# Patient Record
Sex: FEMALE | Race: WHITE | NOT HISPANIC OR LATINO | Employment: FULL TIME | URBAN - NONMETROPOLITAN AREA
[De-identification: names, ages, dates, MRNs, and addresses within clinical notes are randomized per-mention and may not be internally consistent; named-entity substitution may affect disease eponyms.]

---

## 2021-04-06 ENCOUNTER — OFFICE VISIT (OUTPATIENT)
Dept: FAMILY MEDICINE | Facility: OTHER | Age: 26
End: 2021-04-06
Attending: NURSE PRACTITIONER
Payer: COMMERCIAL

## 2021-04-06 VITALS
DIASTOLIC BLOOD PRESSURE: 74 MMHG | RESPIRATION RATE: 16 BRPM | BODY MASS INDEX: 22.36 KG/M2 | HEART RATE: 89 BPM | OXYGEN SATURATION: 99 % | TEMPERATURE: 99 F | HEIGHT: 65 IN | SYSTOLIC BLOOD PRESSURE: 122 MMHG | WEIGHT: 134.2 LBS

## 2021-04-06 DIAGNOSIS — N89.8 VAGINAL DISCHARGE: ICD-10-CM

## 2021-04-06 DIAGNOSIS — B96.89 BACTERIAL VAGINOSIS: Primary | ICD-10-CM

## 2021-04-06 DIAGNOSIS — N89.8 VAGINAL ITCHING: ICD-10-CM

## 2021-04-06 DIAGNOSIS — Z11.3 SCREEN FOR STD (SEXUALLY TRANSMITTED DISEASE): ICD-10-CM

## 2021-04-06 DIAGNOSIS — N76.0 BACTERIAL VAGINOSIS: Primary | ICD-10-CM

## 2021-04-06 LAB
C TRACH DNA SPEC QL NAA+PROBE: NOT DETECTED
N GONORRHOEA DNA SPEC QL NAA+PROBE: NOT DETECTED
SPECIMEN SOURCE: ABNORMAL
SPECIMEN SOURCE: NORMAL
WET PREP SPEC: ABNORMAL

## 2021-04-06 PROCEDURE — 87491 CHLMYD TRACH DNA AMP PROBE: CPT | Mod: ZL | Performed by: PHYSICIAN ASSISTANT

## 2021-04-06 PROCEDURE — 87591 N.GONORRHOEAE DNA AMP PROB: CPT | Mod: ZL | Performed by: PHYSICIAN ASSISTANT

## 2021-04-06 PROCEDURE — 87210 SMEAR WET MOUNT SALINE/INK: CPT | Mod: ZL | Performed by: PHYSICIAN ASSISTANT

## 2021-04-06 PROCEDURE — 99203 OFFICE O/P NEW LOW 30 MIN: CPT | Performed by: PHYSICIAN ASSISTANT

## 2021-04-06 RX ORDER — LEVONORGESTREL/ETHIN.ESTRADIOL 0.1-0.02MG
1 TABLET ORAL DAILY
COMMUNITY

## 2021-04-06 RX ORDER — METRONIDAZOLE 500 MG/1
500 TABLET ORAL 2 TIMES DAILY
Qty: 14 TABLET | Refills: 0 | Status: SHIPPED | OUTPATIENT
Start: 2021-04-06 | End: 2021-04-13

## 2021-04-06 ASSESSMENT — MIFFLIN-ST. JEOR: SCORE: 1364.61

## 2021-04-06 ASSESSMENT — PAIN SCALES - GENERAL: PAINLEVEL: NO PAIN (0)

## 2021-04-06 NOTE — PROGRESS NOTES
"HPI:    Maria Guadalupe Kang is a 25 year old female  who presents to [facility] today for concerns of STD. She has an odor to her vaginal discharge as well as irritation (no pruritis) which started yesterday.    Treated for yeast infection a month ago via virtual visit- was not examined and was given 1 tablet of diflucan. Was told to call if no improvement and she was given a second dose approximately 1 week after her initial dose - [facility]    Parity: 0 children  LMP: about a month ago, no concerns of pregnancy    Vaginal Symptoms:  Burning: No  Discharge: Yes: - while in color - normal for her  [facility] Yes - about 2 months  Exposure to STIs: Unknown  History of STIs: No  Condom Usage: No  Fevers, Chills, Signs of Infection: No    Treatments Tried and Outcome: none    PCP: None    History reviewed. No pertinent past medical history.  History reviewed. No pertinent surgical history.  Social History     Tobacco Use     Smoking status: Never Smoker     Smokeless tobacco: Never Used   Substance Use Topics     Alcohol use: Yes     Current Outpatient Medications   Medication Sig Dispense Refill     levonorgestrel-ethinyl estradiol (AUBRA EQ) 0.1-20 MG-MCG tablet Take 1 tablet by mouth daily       No Known Allergies    Past medical history, past surgical history, current medications and allergies reviewed and accurate to the best of my knowledge.      ROS:  Refer to HPI    /74   Pulse 89   Temp 99  F (37.2  C) (Tympanic)   Resp 16   Ht 1.651 m (5' 5\")   Wt 60.9 kg (134 lb 3.2 oz)   LMP 03/10/2021 (Within Days)   SpO2 99%   BMI 22.33 kg/m      EXAM:  General Appearance: Well appearing 25-year old female, appropriate appearance for age. No acute distress  Respiratory: normal chest wall and respirations.  Normal effort.  Clear to auscultation bilaterally, no wheezing, crackles or rhonchi.  No increased work of breathing.  No cough appreciated.  Cardiac: RRR with no murmurs  Abdomen: soft, " nontender, no rigidity, no rebound tenderness or guarding, normal bowel sounds present  :  No suprapubic tenderness to palpation.  No CVA tenderness to palpation.  On vaginal speculum exam there is white discharge noted.  Vaginal walls and vulva are normal and without erythema, irritation or sores. No odor noted. There is no vaginal or cervical erythema or tenderness upon wet prep and GC chlamydia testing.  Musculoskeletal:  Equal movement of bilateral upper extremities.  Equal movement of bilateral lower extremities.  Normal gait.    Dermatological: no rashes noted of exposed skin  Psychological: normal affect, alert, oriented, and pleasant.     Labs:  Wet prep: positive for clue cells, negative for trichomonas and yeast  Vaginal GC: negative for both    ASSESSMENT/PLAN:    I have reviewed the nursing notes.  I have reviewed the findings, diagnosis, plan and need for follow up with the patient.    (N76.0,  B96.89) Bacterial vaginosis  (primary encounter diagnosis)  (Z11.3) Screen for STD (sexually transmitted disease)  (N89.8) Vaginal discharge  (N89.8) Vaginal itching  Comment: Intermittent onset, worse recently  Plan: Wet Prep, Genital, GC/Chlamydia by PCR, metroNIDAZOLE (FLAGYL) 500 MG tablet         exam performed and G/C and wet preps performed. G/C negative and wet prep was positive for clue cells. Patient will be treated with Flagyl 500mg PO BID x 7-days. She understands to take this until completion even if she feels better prior to this. Recurrence between 3-12 months can occur, she is aware of this. Increase probiotic use while on treatment. Discussed with patient to NOT drink alcohol while taking the medication.  If she develops fevers, chills, worsening signs of infection or recurrent symptoms, she should return for repeat evaluation. Patient called and updated with this information, no questions/concerns. She agrees and understands with above plan.     Alissa Mooney PA-C  4/6/2021  1:37  PM    Discussed warning signs/symptoms indicative of need to f/u    Follow up if symptoms persist or worsen or concerns    I explained my diagnostic considerations and recommendations to the patient, who voiced understanding and agreement with the treatment plan. All questions were answered. We discussed potential side effects of any prescribed or recommended therapies, as well as expectations for response to treatments.

## 2021-04-06 NOTE — NURSING NOTE
"Chief Complaint   Patient presents with     STD     testing     Patient is here for STD testing. Patient states she has an odor to her discharge. Patient states she also has vaginal irritation (not itchy) that started yesterday.     Initial /74   Pulse 89   Temp 99  F (37.2  C) (Tympanic)   Resp 16   Ht 1.651 m (5' 5\")   Wt 60.9 kg (134 lb 3.2 oz)   LMP 03/10/2021 (Within Days)   SpO2 99%   BMI 22.33 kg/m   Estimated body mass index is 22.33 kg/m  as calculated from the following:    Height as of this encounter: 1.651 m (5' 5\").    Weight as of this encounter: 60.9 kg (134 lb 3.2 oz).  Medication Reconciliation: complete    Marilee Devries LPN  "

## 2021-05-10 ENCOUNTER — OFFICE VISIT (OUTPATIENT)
Dept: FAMILY MEDICINE | Facility: OTHER | Age: 26
End: 2021-05-10
Attending: PHYSICIAN ASSISTANT
Payer: MEDICAID

## 2021-05-10 VITALS
DIASTOLIC BLOOD PRESSURE: 82 MMHG | RESPIRATION RATE: 18 BRPM | OXYGEN SATURATION: 96 % | SYSTOLIC BLOOD PRESSURE: 128 MMHG | TEMPERATURE: 98.3 F | BODY MASS INDEX: 22.37 KG/M2 | WEIGHT: 134.4 LBS | HEART RATE: 88 BPM

## 2021-05-10 DIAGNOSIS — N89.8 VAGINAL DISCHARGE: Primary | ICD-10-CM

## 2021-05-10 LAB
SPECIMEN SOURCE: NORMAL
WET PREP SPEC: NORMAL

## 2021-05-10 PROCEDURE — 87210 SMEAR WET MOUNT SALINE/INK: CPT | Mod: ZL | Performed by: PHYSICIAN ASSISTANT

## 2021-05-10 PROCEDURE — G0463 HOSPITAL OUTPT CLINIC VISIT: HCPCS | Performed by: PHYSICIAN ASSISTANT

## 2021-05-10 PROCEDURE — 99213 OFFICE O/P EST LOW 20 MIN: CPT | Performed by: PHYSICIAN ASSISTANT

## 2021-05-10 RX ORDER — FLUCONAZOLE 150 MG/1
150 TABLET ORAL ONCE
Qty: 1 TABLET | Refills: 0 | Status: SHIPPED | OUTPATIENT
Start: 2021-05-10 | End: 2021-05-10

## 2021-05-10 ASSESSMENT — PAIN SCALES - GENERAL: PAINLEVEL: NO PAIN (0)

## 2021-05-10 NOTE — NURSING NOTE
"Chief Complaint   Patient presents with     Vaginal Problem     Vaginal itching has been goning on for 1-2 weeks. She states that it is not every day. She was seen and Did have BV back on 4/6 she went on vacation prior to taking medication than took medication.     Initial There were no vitals taken for this visit. Estimated body mass index is 22.33 kg/m  as calculated from the following:    Height as of 4/6/21: 1.651 m (5' 5\").    Weight as of 4/6/21: 60.9 kg (134 lb 3.2 oz).         Medication Reconciliation: Complete      Isak Duran LPN   "

## 2021-05-13 ENCOUNTER — PATIENT OUTREACH (OUTPATIENT)
Dept: FAMILY MEDICINE | Facility: OTHER | Age: 26
End: 2021-05-13

## 2021-05-13 NOTE — TELEPHONE ENCOUNTER
Please call patient.  Patient called as she was seen on 5/10/21 for vaginal concerns.  Negative wet prep.  Treated with Diflucan.  States symptoms not improving.  Patient needs to follow up with primary care or GYN for further evaluation.  Blanche Herman NP on 5/13/2021 at 10:35 AM

## 2021-09-09 ENCOUNTER — IMMUNIZATION (OUTPATIENT)
Dept: FAMILY MEDICINE | Facility: OTHER | Age: 26
End: 2021-09-09
Attending: FAMILY MEDICINE
Payer: COMMERCIAL

## 2021-09-09 PROCEDURE — 0001A PR COVID VAC PFIZER DIL RECON 30 MCG/0.3 ML IM: CPT

## 2021-09-09 PROCEDURE — 91300 PR COVID VAC PFIZER DIL RECON 30 MCG/0.3 ML IM: CPT

## 2021-10-04 ENCOUNTER — IMMUNIZATION (OUTPATIENT)
Dept: FAMILY MEDICINE | Facility: OTHER | Age: 26
End: 2021-10-04
Attending: FAMILY MEDICINE
Payer: COMMERCIAL

## 2021-10-04 PROCEDURE — 0002A PR COVID VAC PFIZER DIL RECON 30 MCG/0.3 ML IM: CPT

## 2021-10-04 PROCEDURE — 91300 PR COVID VAC PFIZER DIL RECON 30 MCG/0.3 ML IM: CPT

## 2022-11-30 ENCOUNTER — OFFICE VISIT (OUTPATIENT)
Dept: FAMILY MEDICINE | Facility: OTHER | Age: 27
End: 2022-11-30
Attending: FAMILY MEDICINE
Payer: COMMERCIAL

## 2022-11-30 VITALS
DIASTOLIC BLOOD PRESSURE: 60 MMHG | SYSTOLIC BLOOD PRESSURE: 118 MMHG | HEIGHT: 66 IN | RESPIRATION RATE: 20 BRPM | TEMPERATURE: 97.3 F | BODY MASS INDEX: 21.21 KG/M2 | WEIGHT: 132 LBS | OXYGEN SATURATION: 100 % | HEART RATE: 102 BPM

## 2022-11-30 DIAGNOSIS — Z12.4 SCREENING FOR MALIGNANT NEOPLASM OF CERVIX: ICD-10-CM

## 2022-11-30 DIAGNOSIS — N89.8 VAGINAL ITCHING: ICD-10-CM

## 2022-11-30 DIAGNOSIS — L30.9 VULVAR DERMATITIS: Primary | ICD-10-CM

## 2022-11-30 LAB
C TRACH DNA SPEC QL PROBE+SIG AMP: NEGATIVE
CLUE CELLS: ABNORMAL
N GONORRHOEA DNA SPEC QL NAA+PROBE: NEGATIVE
TRICHOMONAS, WET PREP: ABNORMAL
WBC'S/HIGH POWER FIELD, WET PREP: ABNORMAL
YEAST, WET PREP: ABNORMAL

## 2022-11-30 PROCEDURE — 87210 SMEAR WET MOUNT SALINE/INK: CPT | Mod: ZL | Performed by: FAMILY MEDICINE

## 2022-11-30 PROCEDURE — 87591 N.GONORRHOEAE DNA AMP PROB: CPT | Mod: ZL | Performed by: FAMILY MEDICINE

## 2022-11-30 PROCEDURE — 99213 OFFICE O/P EST LOW 20 MIN: CPT | Performed by: FAMILY MEDICINE

## 2022-11-30 PROCEDURE — G0123 SCREEN CERV/VAG THIN LAYER: HCPCS | Performed by: FAMILY MEDICINE

## 2022-11-30 PROCEDURE — 87491 CHLMYD TRACH DNA AMP PROBE: CPT | Mod: ZL | Performed by: FAMILY MEDICINE

## 2022-11-30 ASSESSMENT — PAIN SCALES - GENERAL: PAINLEVEL: NO PAIN (0)

## 2022-11-30 NOTE — NURSING NOTE
"Patient presents to the clinic for vaginal concerns.    FOOD SECURITY SCREENING QUESTIONS:    The next two questions are to help us understand your food security.  If you are feeling you need any assistance in this area, we have resources available to support you today.    Hunger Vital Signs:  Within the past 12 months we worried whether our food would run out before we got money to buy more. Never  Within the past 12 months the food we bought just didn't last and we didn't have money to get more. Never    Advance Care Directive on file? no  Advance Care Directive provided to patient? Declined.    Chief Complaint   Patient presents with     Vaginal Problem       Initial /60 (BP Location: Right arm, Patient Position: Sitting, Cuff Size: Adult Regular)   Pulse 102   Temp 97.3  F (36.3  C) (Tympanic)   Resp 20   Ht 1.676 m (5' 6\")   Wt 59.9 kg (132 lb)   SpO2 100%   BMI 21.31 kg/m   Estimated body mass index is 21.31 kg/m  as calculated from the following:    Height as of this encounter: 1.676 m (5' 6\").    Weight as of this encounter: 59.9 kg (132 lb).  Medication Reconciliation: complete        Sanaz Calles LPN       "

## 2022-11-30 NOTE — PROGRESS NOTES
Assessment & Plan       ICD-10-CM    1. Vulvar dermatitis  L30.9       2. Vaginal itching  N89.8 GC/Chlamydia by PCR     Wet Prep, Genital     GC/Chlamydia by PCR      3. Screening for malignant neoplasm of cervix  Z12.4 Pap Screen reflex to HPV if ASCUS - recommended age 25 - 29 years        Based on exam, she has a vulvar dermatitis.  Likely contact dermatitis.  She is not aware of any change in soaps or detergents.  She did use some different pantiliners last week with her period.  We reviewed other potential etiologies such as latex, retained moisture.  Recommend air drying after bathing.  Cotton underwear is recommended.  Obtain wet prep to assess for yeast or BV.  Assuming they are negative, she could treat dermatitis sparingly with hydrocortisone ointment.  This was discussed with her.  Potentially this is just a one-time issue, but if she continues to have problems, continue to think about an etiology to avoid flares.    Wet prep later returned negative.  GC chlamydia negative.    She reports having a Pap smear at [facility] around age 23.  Seen for a physical at [facility] last year, but they misunderstood that she did not have a Pap smear obtained.  This was performed today.  If negative, repeat 3 years.    Skinny Perez MD  [facility] AND [facility]   Maria Guadalupe is a 27 year old, presenting for the following health issues:  Vaginal Problem      Vaginal Problem     History of Present Illness       Reason for visit:  Test for yeast infection  Symptom onset:  1-3 days ago  Symptoms include:  Itching/scratchy feeling  Symptom intensity:  Mild  Symptom progression:  Worsening  Had these symptoms before:  Yes  Has tried/received treatment for these symptoms:  Yes  Previous treatment was successful:  Yes  Prior treatment description:  Medication  What makes it worse:  No  What makes it better:  No    She eats 0-1 servings of fruits and vegetables daily.She consumes 0 sweetened  "beverage(s) daily.She exercises with enough effort to increase her heart rate 9 or less minutes per day.  She exercises with enough effort to increase her heart rate 3 or less days per week.   She is taking medications regularly.       More vulvar itching  She had treatment for BV and yeast infection in April 2021      Review of Systems   Genitourinary: Positive for vaginal discharge.            Objective    /60 (BP Location: Right arm, Patient Position: Sitting, Cuff Size: Adult Regular)   Pulse 102   Temp 97.3  F (36.3  C) (Tympanic)   Resp 20   Ht 1.676 m (5' 6\")   Wt 59.9 kg (132 lb)   SpO2 100%   BMI 21.31 kg/m    Body mass index is 21.31 kg/m .  Physical Exam   General Appearance: Alert. No acute distress  Psychiatric: Normal affect and mentation  : Exam with nurse present.  Shaved all pubic hair.  Diffuse area of dusky erythema bilateral vulvar area.  Minimal vaginal discharge.  Cervix appears normal.  GC chlamydia obtained.  Pap obtained.  Wet prep obtained    Results for orders placed or performed in visit on 11/30/22   Wet Prep, Genital     Status: Abnormal    Specimen: Vagina; Swab   Result Value Ref Range    Trichomonas Absent Absent    Yeast Absent Absent    Clue Cells Absent Absent    WBCs/high power field 3+ (A) None   GC/Chlamydia by PCR     Status: Normal    Specimen: Cervix; Swab   Result Value Ref Range    Chlamydia Trachomatis Negative Negative    Neisseria gonorrhoeae Negative Negative    Narrative    Assay performed using [employer] real-time, reverse-transcriptase PCR.                    "

## 2022-11-30 NOTE — PATIENT INSTRUCTIONS
Wait on tests  Most likely there is skin irritation  Check for any change in soaps, detergents, panty liner  If tests are normal, then use hydrocortisone 1% ointment to help reduce itching for up to a few weeks

## 2022-12-02 LAB
BKR LAB AP GYN ADEQUACY: NORMAL
BKR LAB AP GYN INTERPRETATION: NORMAL
BKR LAB AP HPV REFLEX: NORMAL
BKR LAB AP LMP: NORMAL
BKR LAB AP PREVIOUS ABNORMAL: NORMAL
PATH REPORT.COMMENTS IMP SPEC: NORMAL
PATH REPORT.COMMENTS IMP SPEC: NORMAL
PATH REPORT.RELEVANT HX SPEC: NORMAL